# Patient Record
Sex: MALE | Race: WHITE | NOT HISPANIC OR LATINO | Employment: OTHER | URBAN - METROPOLITAN AREA
[De-identification: names, ages, dates, MRNs, and addresses within clinical notes are randomized per-mention and may not be internally consistent; named-entity substitution may affect disease eponyms.]

---

## 2019-05-08 NOTE — PATIENT DISCUSSION
Cataract APPEARS VISUALLY SIGNIFICANT and patient advised to SEE  [facility] [place] [facility].

## 2022-04-13 ENCOUNTER — ESTABLISHED PATIENT (OUTPATIENT)
Dept: URBAN - METROPOLITAN AREA CLINIC 37 | Facility: CLINIC | Age: 67
End: 2022-04-13

## 2022-04-13 DIAGNOSIS — H43.392: ICD-10-CM

## 2022-04-13 DIAGNOSIS — H52.13: ICD-10-CM

## 2022-04-13 DIAGNOSIS — H25.13: ICD-10-CM

## 2022-04-13 PROCEDURE — 92015 DETERMINE REFRACTIVE STATE: CPT

## 2022-04-13 PROCEDURE — 92014 COMPRE OPH EXAM EST PT 1/>: CPT

## 2022-04-13 PROCEDURE — 92250 FUNDUS PHOTOGRAPHY W/I&R: CPT

## 2022-04-13 ASSESSMENT — VISUAL ACUITY
OU_SC: 20/20
OS_SC: 20/25
OD_SC: 20/25

## 2022-04-13 ASSESSMENT — TONOMETRY
OD_IOP_MMHG: 16
OS_IOP_MMHG: 20